# Patient Record
Sex: MALE | Race: WHITE | NOT HISPANIC OR LATINO | Employment: FULL TIME | URBAN - METROPOLITAN AREA
[De-identification: names, ages, dates, MRNs, and addresses within clinical notes are randomized per-mention and may not be internally consistent; named-entity substitution may affect disease eponyms.]

---

## 2022-08-02 ENCOUNTER — OFFICE VISIT (OUTPATIENT)
Dept: UROLOGY | Facility: CLINIC | Age: 32
End: 2022-08-02

## 2022-08-02 VITALS — BODY MASS INDEX: 34.95 KG/M2 | WEIGHT: 258 LBS | HEIGHT: 72 IN

## 2022-08-02 DIAGNOSIS — Z30.09 VASECTOMY EVALUATION: Primary | ICD-10-CM

## 2022-08-02 PROCEDURE — 99204 OFFICE O/P NEW MOD 45 MIN: CPT | Performed by: STUDENT IN AN ORGANIZED HEALTH CARE EDUCATION/TRAINING PROGRAM

## 2022-08-02 RX ORDER — DIAZEPAM 10 MG/1
10 TABLET ORAL ONCE
Qty: 1 TABLET | Refills: 0 | Status: SHIPPED | OUTPATIENT
Start: 2022-08-02 | End: 2022-08-02

## 2022-08-02 NOTE — PROGRESS NOTES
Office Note Vasectomy Consult     Patient Name: Axel Neri  : 1990   MRN: [id number]     Chief Complaint:  Elective Sterilization.   Chief Complaint   Patient presents with   • Vasectomy Consult       Referring Provider: Angela Barrios AP*    History of Present Illness: Axel Neri is a 32 y.o. male who presents to Urology today with the desire for irreversible, elective sterilization.     He has 1 biological children and 4 step children, his wife is 35 years old.    His and his wife have discussed this decision at length and both would like to proceed with elective sterilization.    He has been considering this decision for 2 years.    Patient denies history of prior scrotal surgery, denies significant history of scrotal injury, denies any baseline chronic testicular pain.      Subjective      Review of Systems: Review of Systems   Constitutional: Negative for chills, fatigue, fever and unexpected weight change.   HENT: Negative for sore throat.    Eyes: Negative for visual disturbance.   Respiratory: Negative for cough, chest tightness and shortness of breath.    Cardiovascular: Negative for chest pain and leg swelling.   Gastrointestinal: Negative for blood in stool, constipation, diarrhea, nausea, rectal pain and vomiting.   Genitourinary: Negative for decreased urine volume, difficulty urinating, dysuria, enuresis, flank pain, frequency, genital sores, hematuria and urgency.   Musculoskeletal: Negative for back pain and joint swelling.   Skin: Negative for rash and wound.   Neurological: Negative for seizures, speech difficulty, weakness and headaches.   Psychiatric/Behavioral: Negative for confusion, sleep disturbance and suicidal ideas. The patient is not nervous/anxious.       I have reviewed the ROS documented by my clinical staff, I have updated appropriately and I agree. Kingsley Fallon MD    Past Medical History: History reviewed. No pertinent past medical  "history.    Past Surgical History:   Past Surgical History:   Procedure Laterality Date   • WISDOM TOOTH EXTRACTION         Family History: History reviewed. No pertinent family history.    Social History:   Social History     Socioeconomic History   • Marital status:    Tobacco Use   • Smoking status: Never Smoker   Vaping Use   • Vaping Use: Never used   Substance and Sexual Activity   • Alcohol use: Not Currently   • Drug use: Never   • Sexual activity: Yes     Partners: Female       Medications:     Current Outpatient Medications:   •  diazePAM (Valium) 10 MG tablet, Take 1 tablet by mouth 1 (One) Time for 1 dose. Take 1 hour prior to vasectomy., Disp: 1 tablet, Rfl: 0    Allergies:   No Known Allergies       Objective     Physical Exam:   Vital Signs:   Vitals:    08/02/22 0809   Weight: 117 kg (258 lb)   Height: 182.9 cm (72\")     Body mass index is 34.99 kg/m².     Physical Exam  Constitutional:       Appearance: Normal appearance.   HENT:      Head: Normocephalic and atraumatic.      Nose: Nose normal.   Eyes:      Extraocular Movements: Extraocular movements intact.      Conjunctiva/sclera: Conjunctivae normal.      Pupils: Pupils are equal, round, and reactive to light.   Musculoskeletal:         General: Normal range of motion.      Cervical back: Normal range of motion and neck supple.   Skin:     General: Skin is warm and dry.      Findings: No lesion or rash.   Neurological:      General: No focal deficit present.      Mental Status: He is alert and oriented to person, place, and time. Mental status is at baseline.   Psychiatric:         Mood and Affect: Mood normal.         Behavior: Behavior normal.         Labs:   Brief Urine Lab Results     None               No results found for: GLUCOSE, CALCIUM, NA, K, CO2, CL, BUN, CREATININE, EGFRIFAFRI, EGFRIFNONA, BCR, ANIONGAP    No results found for: WBC, HGB, HCT, MCV, PLT    Images:   No Images in the past 120 days found..    Measures:   Tobacco: "   Axel Neri  reports that he has never smoked. He does not have any smokeless tobacco history on file.              Assessment / Plan      Assessment/Plan:   Mr. Neri is a 32 y.o. male who presented to clinic today for irreversible elective sterilization.      Diagnoses and all orders for this visit:    1. Vasectomy evaluation (Primary)  -     diazePAM (Valium) 10 MG tablet; Take 1 tablet by mouth 1 (One) Time for 1 dose. Take 1 hour prior to vasectomy.  Dispense: 1 tablet; Refill: 0         Patient Education:   The patient has requested a vasectomy for elective sterilization and the risks and benefits of the procedure were explained at length. The procedure itself was explained and postop followup explained at this point. The patient was given a prescription for Valium 10 mg to be taken 30 minutes prior to the procedure.  We discussed he will need a  to take him home. I extensively reviewed with him the likely postoperative recuperative period as well as the need to continue to use contraception until he is notified by me of his sterility.     I discussed with the patient that I am able to perform this procedure in the urology clinic under a local anesthetic, and also offer the procedure to be performed at the outpatient surgery center under light anesthetic if that would be the desire of the patient.  I discussed that in clinic procedure is likely significantly cheaper than performing this at an outpatient surgery center.  I counseled the patient to speak to the outpatient surgery center billing department and with his insurance company prior to determine out-of-pocket costs if this is something he would like to consider.  When I perform this procedure in the clinic, I typically offer a 10 mg tablet of Valium 45 to 60 minutes prior to the vasectomy for anxiolysis.    He will undergo a semen analysis 3 months post-procedure AND 20 ejaculations before his first semen analysis check. He understands  the potential side effects of anesthesia, bleeding, scrotal hematoma, wound infection, epididymo-orchitis, epididymal congestion, chronic testicular pain requiring further intervention/surgery, sperm granuloma, recanalization and risk of sperm return and/or pregnancy  (1 in 2000 as per the AUA guidelines).     Patient understands and would like to proceed with vasectomy performed in clinic.     Follow Up:   Return in about 4 weeks (around 8/30/2022) for Vasectomy 8/30/22.    I spent approximately 30 minutes providing clinical care for this patient; including review of patient's chart and provider documentation, face to face time spent with patient in examination room (obtaining history, performing physical exam, discussing diagnosis and management options), placing orders, and completing patient documentation.     Kingsley Fallon MD  [facility] [place] [facility] [place]

## 2022-08-23 ENCOUNTER — PROCEDURE VISIT (OUTPATIENT)
Dept: UROLOGY | Facility: CLINIC | Age: 32
End: 2022-08-23

## 2022-08-23 VITALS — BODY MASS INDEX: 34.95 KG/M2 | HEIGHT: 72 IN | WEIGHT: 258 LBS

## 2022-08-23 DIAGNOSIS — Z30.2 ENCOUNTER FOR VASECTOMY: Primary | ICD-10-CM

## 2022-08-23 PROCEDURE — 55250 REMOVAL OF SPERM DUCT(S): CPT | Performed by: STUDENT IN AN ORGANIZED HEALTH CARE EDUCATION/TRAINING PROGRAM

## 2022-08-23 RX ORDER — HYDROCODONE BITARTRATE AND ACETAMINOPHEN 5; 325 MG/1; MG/1
1 TABLET ORAL EVERY 6 HOURS PRN
Qty: 8 TABLET | Refills: 0 | Status: SHIPPED | OUTPATIENT
Start: 2022-08-23

## 2022-08-23 NOTE — PROGRESS NOTES
Preprocedure diagnosis  Encounter for sterilization (Z30.2)     Postprocedure diagnosis  Encounter for sterilization (Z30.2)     Procedure  Bilateral Vasectomy    Attending surgeon  Kingsley Fallon MD    Anesthesia  1% Lidocaine mixed with 0.5% Bupivicaine local anesthetic     Complications  None    Indications  32 y.o. male who desires desires elective sterility presents for vasectomy.  Informed consent was reviewed and signed.  Risks, benefits, alternatives to the procedure were discussed.  The patient took 10 mg Valium tablet 1 hour prior to procedure today for anxiolysis.    Findings  - Uncomplicated bilateral vasectomy  - 1 cm segments of the bilateral vas deferens sent to pathology    Procedure  The patient was identified and informed consent was reviewed and signed.  He was placed in the supine position.  His genitals were prepped and draped in sterile fashion.  I isolated the right vas deferens between my fingers.  I injected local anesthetic at the skin and deep to the dartos tissue.  Once the patient was numb, I used the sharp vasa dissector to separate a 1 cm incision in the skin.  I used the ring clamp to isolate the vas deferens and pull this out through the skin.  I used Bovie cautery to cauterize some of the perivasal vessels and then I skeletonized the vas deferens for 1-1/2 cm.  I used 2 Fatimah clamps to clamp the testicular and abdominal ends of the vas deferens.  Between the Fatimah I took a 1 cm segment of the vas deferens by excising with the Bovie cautery.  I then cauterized the remaining ends of the vas deferens with the Bovie cautery.  The right sided segment was sent off to pathology.       Next I cleared off some of the perivasal vasculature below the Fatimah clamp and used the Bovie and Adson clamps for cauterizing these vessels, I then suture tied the ends of the vas deferens back onto themselves to prevent recanalization with a 4-0 Chromic suture.  Once hemostasis was confirmed, I  returned both ends of the vas deferens into the right-sided hemiscrotal space.  I used the remaining 4-0 chromic suture to sew a horizontal mattress stitch at the skin incision.  Hemostasis was confirmed.      I performed the identical procedure on the left side.  The left-sided vas deferens segment was sent off to pathology.  The skin was closed with a horizontal mattress 4-0 chromic suture again.  Neosporin was applied over the incisions and scrotal fluff gauze were placed.  The patient tolerated the procedure well.    Follow Up: Patient will complete 20 ejaculations and wait 3 months before he provides us a semen analysis.  He was sent home with hydrocodone, Senokot and Neosporin to apply over his incision for 7 days.  Return precautions discussed at length.     Kingsley Fallon MD

## 2022-08-24 LAB — REF LAB TEST METHOD: NORMAL

## 2023-01-25 ENCOUNTER — TELEPHONE (OUTPATIENT)
Dept: UROLOGY | Facility: CLINIC | Age: 33
End: 2023-01-25
Payer: COMMERCIAL

## 2023-01-25 NOTE — TELEPHONE ENCOUNTER
Caller: Axel Neri    Relationship to patient: SELF    Best call back number: [phone]    Patient is needing: PT CALLED AND WAS ASKING IF HE SHOULD STILL BRING IN SPECIMEN AFTER HIS VASECTOMY. VASECTOMY WAS COMPLETED ON  08/23/22 AND PT LOST TRACK OF TIME OF WHEN TO BRING IT IN. PLEASE CALL PT BACK TO DISCUSS OPTIONS

## 2023-01-25 NOTE — TELEPHONE ENCOUNTER
Provided pt with the number for the lab in the hospital, where he can call and schedule appointment for post vasectomy semen analysis.

## 2023-01-30 ENCOUNTER — LAB (OUTPATIENT)
Dept: LAB | Facility: HOSPITAL | Age: 33
End: 2023-01-30
Payer: COMMERCIAL

## 2023-01-30 ENCOUNTER — TELEPHONE (OUTPATIENT)
Dept: UROLOGY | Facility: CLINIC | Age: 33
End: 2023-01-30
Payer: COMMERCIAL

## 2023-01-30 DIAGNOSIS — Z30.2 ENCOUNTER FOR VASECTOMY: ICD-10-CM

## 2023-01-30 LAB — SPERM - POST VASECTOMY: NORMAL

## 2023-01-30 PROCEDURE — 89321 SEMEN ANAL SPERM DETECTION: CPT

## 2023-01-30 NOTE — TELEPHONE ENCOUNTER
----- Message from Kingsley Fallon MD sent at 1/30/2023 12:21 PM EST -----  Please call patient and let him know that his semen analysis demonstrates no sperm after vasectomy therefore he is cleared from my perspective.  Thank you.

## 2023-09-08 ENCOUNTER — TELEPHONE (OUTPATIENT)
Dept: UROLOGY | Facility: CLINIC | Age: 33
End: 2023-09-08
Payer: COMMERCIAL

## 2023-09-08 NOTE — TELEPHONE ENCOUNTER
No answer.  Phone number has been changed.   Mailed letter to pt, asking pt to come by the office to sign the medicaid form for the vasectomy he had back in 08/22.  We need this form to get paid by Medicaid.

## 2023-12-14 ENCOUNTER — TELEPHONE (OUTPATIENT)
Dept: UROLOGY | Facility: CLINIC | Age: 33
End: 2023-12-14
Payer: COMMERCIAL

## 2023-12-14 NOTE — TELEPHONE ENCOUNTER
CALLED AND WAS UNABLE TO REACH PT OR SPOUSE ABOUT SIGNING STERILIZATION CONSENT FORM. BOTH NUMBERS DISCONNECTED. SENT ANOTHER LETTER TO PT'S HOME. SPOKE TO MONICA AT REQUESTING OFFICE [phone] AND SHE SAID THAT SHE WOULD DOCUMENT ATTEMPT TO OBTAIN FORM AND THAT A SECOND LETTER HAD BEEN SENT.